# Patient Record
Sex: FEMALE | Race: BLACK OR AFRICAN AMERICAN | URBAN - METROPOLITAN AREA
[De-identification: names, ages, dates, MRNs, and addresses within clinical notes are randomized per-mention and may not be internally consistent; named-entity substitution may affect disease eponyms.]

---

## 2017-09-19 ENCOUNTER — HISTORICAL (OUTPATIENT)
Dept: LAB | Facility: HOSPITAL | Age: 32
End: 2017-09-19

## 2017-09-19 LAB
ABS NEUT (OLG): 2.98 X10(3)/MCL (ref 2.1–9.2)
ALBUMIN SERPL-MCNC: 3.6 GM/DL (ref 3.4–5)
ALBUMIN/GLOB SERPL: 0.9 RATIO (ref 1.1–2)
ALP SERPL-CCNC: 76 UNIT/L (ref 38–126)
ALT SERPL-CCNC: 22 UNIT/L (ref 12–78)
APPEARANCE, UA: ABNORMAL
AST SERPL-CCNC: 13 UNIT/L (ref 15–37)
BACTERIA SPEC CULT: ABNORMAL /HPF
BASOPHILS # BLD AUTO: 0 X10(3)/MCL (ref 0–0.2)
BASOPHILS NFR BLD AUTO: 0 %
BILIRUB SERPL-MCNC: 0.7 MG/DL (ref 0.2–1)
BILIRUB UR QL STRIP: NEGATIVE
BILIRUBIN DIRECT+TOT PNL SERPL-MCNC: 0
BILIRUBIN DIRECT+TOT PNL SERPL-MCNC: 0.7 MG/DL (ref 0–0.8)
BUN SERPL-MCNC: 10 MG/DL (ref 7–18)
CALCIUM SERPL-MCNC: 8.8 MG/DL (ref 8.5–10.1)
CHLORIDE SERPL-SCNC: 105 MMOL/L (ref 98–107)
CHOLEST SERPL-MCNC: 177 MG/DL (ref 0–200)
CHOLEST/HDLC SERPL: 3.5 {RATIO} (ref 0–4)
CO2 SERPL-SCNC: 27 MMOL/L (ref 21–32)
COLOR UR: YELLOW
CREAT SERPL-MCNC: 0.74 MG/DL (ref 0.55–1.02)
DEPRECATED CALCIDIOL+CALCIFEROL SERPL-MC: 6.94 NG/ML (ref 30–80)
EOSINOPHIL # BLD AUTO: 0.3 X10(3)/MCL (ref 0–0.9)
EOSINOPHIL NFR BLD AUTO: 4 %
ERYTHROCYTE [DISTWIDTH] IN BLOOD BY AUTOMATED COUNT: 12.4 % (ref 11.5–17)
EST. AVERAGE GLUCOSE BLD GHB EST-MCNC: 94 MG/DL
GLOBULIN SER-MCNC: 3.9 GM/DL (ref 2.4–3.5)
GLUCOSE (UA): NEGATIVE
GLUCOSE SERPL-MCNC: 87 MG/DL (ref 74–106)
HAV IGM SERPL QL IA: NEGATIVE
HBA1C MFR BLD: 4.9 % (ref 4.2–6.3)
HBV CORE IGM SERPL QL IA: NEGATIVE
HBV SURFACE AG SERPL QL IA: NEGATIVE
HCT VFR BLD AUTO: 41.9 % (ref 37–47)
HCV AB SERPL QL IA: NEGATIVE
HDLC SERPL-MCNC: 50 MG/DL (ref 35–60)
HEPATITIS PANEL INTERP: NORMAL
HGB BLD-MCNC: 13 GM/DL (ref 12–16)
HGB UR QL STRIP: NEGATIVE
HIV 1+2 AB+HIV1 P24 AG SERPL QL IA: NEGATIVE
KETONES UR QL STRIP: NEGATIVE
LDLC SERPL CALC-MCNC: 104 MG/DL (ref 0–129)
LEUKOCYTE ESTERASE UR QL STRIP: ABNORMAL
LYMPHOCYTES # BLD AUTO: 3.5 X10(3)/MCL (ref 0.6–4.6)
LYMPHOCYTES NFR BLD AUTO: 48 %
MCH RBC QN AUTO: 28.1 PG (ref 27–31)
MCHC RBC AUTO-ENTMCNC: 31 GM/DL (ref 33–36)
MCV RBC AUTO: 90.5 FL (ref 80–94)
MONOCYTES # BLD AUTO: 0.5 X10(3)/MCL (ref 0.1–1.3)
MONOCYTES NFR BLD AUTO: 7 %
NEUTROPHILS # BLD AUTO: 2.98 X10(3)/MCL (ref 2.1–9.2)
NEUTROPHILS NFR BLD AUTO: 41 %
NITRITE UR QL STRIP: NEGATIVE
PH UR STRIP: 6.5 [PH] (ref 5–9)
PLATELET # BLD AUTO: 376 X10(3)/MCL (ref 130–400)
PMV BLD AUTO: 9.4 FL (ref 9.4–12.4)
POTASSIUM SERPL-SCNC: 4.8 MMOL/L (ref 3.5–5.1)
PROT SERPL-MCNC: 7.5 GM/DL (ref 6.4–8.2)
PROT UR QL STRIP: NEGATIVE
RBC # BLD AUTO: 4.63 X10(6)/MCL (ref 4.2–5.4)
RBC #/AREA URNS HPF: ABNORMAL /[HPF]
RPR SER QL: NORMAL
SODIUM SERPL-SCNC: 142 MMOL/L (ref 136–145)
SP GR UR STRIP: 1.02 (ref 1–1.03)
SQUAMOUS EPITHELIAL, UA: ABNORMAL
TRIGL SERPL-MCNC: 116 MG/DL (ref 30–150)
TSH SERPL-ACNC: 0.85 MIU/ML (ref 0.36–3.74)
UROBILINOGEN UR STRIP-ACNC: 1
VLDLC SERPL CALC-MCNC: 23 MG/DL
WBC # SPEC AUTO: 7.3 X10(3)/MCL (ref 4.5–11.5)
WBC #/AREA URNS HPF: ABNORMAL /[HPF]

## 2017-11-06 ENCOUNTER — HISTORICAL (OUTPATIENT)
Dept: HEMATOLOGY/ONCOLOGY | Facility: CLINIC | Age: 32
End: 2017-11-06

## 2017-12-19 LAB
INFLUENZA A ANTIGEN, POC: POSITIVE
INFLUENZA B ANTIGEN, POC: NEGATIVE
RAPID GROUP A STREP (OHS): NEGATIVE

## 2018-07-17 ENCOUNTER — HISTORICAL (OUTPATIENT)
Dept: LAB | Facility: HOSPITAL | Age: 33
End: 2018-07-17

## 2018-07-17 LAB
APPEARANCE, UA: CLEAR
BACTERIA SPEC CULT: NORMAL /HPF
BILIRUB UR QL STRIP: NEGATIVE
COLOR UR: YELLOW
GLUCOSE (UA): NEGATIVE
HGB UR QL STRIP: NEGATIVE
KETONES UR QL STRIP: NEGATIVE
LEUKOCYTE ESTERASE UR QL STRIP: NEGATIVE
NITRITE UR QL STRIP: NEGATIVE
PH UR STRIP: 6 [PH] (ref 5–9)
PROT UR QL STRIP: NEGATIVE
RBC #/AREA URNS HPF: NORMAL /[HPF]
SP GR UR STRIP: 1.01 (ref 1–1.03)
SQUAMOUS EPITHELIAL, UA: NORMAL
UROBILINOGEN UR STRIP-ACNC: 1
WBC #/AREA URNS HPF: NORMAL /[HPF]

## 2018-07-19 LAB — FINAL CULTURE: NO GROWTH

## 2019-02-26 ENCOUNTER — HISTORICAL (OUTPATIENT)
Dept: ADMINISTRATIVE | Facility: HOSPITAL | Age: 34
End: 2019-02-26

## 2019-02-26 LAB
ALBUMIN SERPL-MCNC: 3.5 GM/DL (ref 3.4–5)
ALBUMIN/GLOB SERPL: 1 {RATIO}
ALP SERPL-CCNC: 71 UNIT/L (ref 38–126)
ALT SERPL-CCNC: 16 UNIT/L (ref 12–78)
AMPHET UR QL SCN: NORMAL
AST SERPL-CCNC: 10 UNIT/L (ref 15–37)
B-HCG SERPL QL: NEGATIVE
BARBITURATE SCN PRESENT UR: NORMAL
BENZODIAZ UR QL SCN: NORMAL
BILIRUB SERPL-MCNC: 0.4 MG/DL (ref 0.2–1)
BILIRUBIN DIRECT+TOT PNL SERPL-MCNC: 0.1 MG/DL (ref 0–0.2)
BILIRUBIN DIRECT+TOT PNL SERPL-MCNC: 0.3 MG/DL (ref 0–0.8)
BUN SERPL-MCNC: 7 MG/DL (ref 7–18)
CALCIUM SERPL-MCNC: 8.3 MG/DL (ref 8.5–10.1)
CANNABINOIDS UR QL SCN: NORMAL
CHLORIDE SERPL-SCNC: 105 MMOL/L (ref 98–107)
CO2 SERPL-SCNC: 27 MMOL/L (ref 21–32)
COCAINE UR QL SCN: NORMAL
CREAT SERPL-MCNC: 0.78 MG/DL (ref 0.55–1.02)
DEPRECATED CALCIDIOL+CALCIFEROL SERPL-MC: 27.8 NG/ML (ref 30–80)
ERYTHROCYTE [DISTWIDTH] IN BLOOD BY AUTOMATED COUNT: 12.2 % (ref 11.5–17)
GLOBULIN SER-MCNC: 3.5 GM/DL (ref 2.4–3.5)
GLUCOSE SERPL-MCNC: 81 MG/DL (ref 74–106)
HCT VFR BLD AUTO: 39.1 % (ref 37–47)
HGB BLD-MCNC: 12.2 GM/DL (ref 12–16)
MCH RBC QN AUTO: 28.1 PG (ref 27–31)
MCHC RBC AUTO-ENTMCNC: 31.2 GM/DL (ref 33–36)
MCV RBC AUTO: 90.1 FL (ref 80–94)
OPIATES UR QL SCN: NORMAL
PCP UR QL: NORMAL
PH UR STRIP.AUTO: 6 [PH] (ref 5–7.5)
PLATELET # BLD AUTO: 409 X10(3)/MCL (ref 130–400)
PMV BLD AUTO: 9.8 FL (ref 9.4–12.4)
POTASSIUM SERPL-SCNC: 3.9 MMOL/L (ref 3.5–5.1)
PROT SERPL-MCNC: 7 GM/DL (ref 6.4–8.2)
RBC # BLD AUTO: 4.34 X10(6)/MCL (ref 4.2–5.4)
SODIUM SERPL-SCNC: 138 MMOL/L (ref 136–145)
SP GR FLD REFRACTOMETRY: 1.01 (ref 1–1.03)
T3RU NFR SERPL: 36 % (ref 31–39)
T4 SERPL-MCNC: 8.2 MCG/DL (ref 4.7–13.3)
TSH SERPL-ACNC: 0.7 MIU/L (ref 0.36–3.74)
WBC # SPEC AUTO: 6.7 X10(3)/MCL (ref 4.5–11.5)

## 2022-04-10 ENCOUNTER — HISTORICAL (OUTPATIENT)
Dept: ADMINISTRATIVE | Facility: HOSPITAL | Age: 37
End: 2022-04-10

## 2022-04-27 VITALS
SYSTOLIC BLOOD PRESSURE: 136 MMHG | OXYGEN SATURATION: 100 % | BODY MASS INDEX: 39.22 KG/M2 | WEIGHT: 235.44 LBS | DIASTOLIC BLOOD PRESSURE: 100 MMHG | HEIGHT: 65 IN

## 2022-04-30 NOTE — H&P
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        Health Status   Allergies:    Allergic Reactions (All)  Mild  Lortab- Itch.  Canceled/Inactive Reactions (All)  No Known Allergies,    Allergies (1) Active Reaction  Lortab itch     Current medications:  (Selected)   Prescriptions  Prescribed  Contrave 8 mg-90 mg oral tablet, extended release: 1 tab(s), Oral, BID, do not crush or chew, only start this Rx after completing starter pack, # 60 tab(s), 2 Refill(s), Pharmacy: [facility]/pharmacy #[id number]  Contrave 8 mg-90 mg oral tablet, extended release: See Instructions, Start as follows:  Wk 1: 1 tab po x qAM  Wk 2: 1 tab po x bid  Wk 3: 2 tabs po x qAM, 1 tab po x qPM  Wk 4: 2 tabs po x bid, # 70 tab(s), 0 Refill(s), Pharmacy: [facility]/pharmacy #[id number]  Vitamin D2 50,000 intl units oral capsule: 50,000 IntUnit = 1 cap(s), Oral, qWeek, # 4 cap(s), 2 Refill(s), Pharmacy: [facility]acy #[id number]   Problem list:    All Problems  Bacterial UTI / SNOMED CT 8967361299 / Confirmed  Pap smear for cervical cancer screening / SNOMED CT 5921000634 / Confirmed  History of herpes genitalis / SNOMED CT 3556336174 / Confirmed  ASCUS with positive high risk human papillomavirus of vagina / SNOMED CT 402640531 / Confirmed  Idiopathic hypocalcemia / SNOMED CT  8452962 / Confirmed  Obesity / SNOMED CT 9038976738 / Confirmed  Preventative health care / SNOMED CT 717725999 / Confirmed  Screen for STD (sexually transmitted disease) / SNOMED CT 384644384 / Confirmed  Vitamin D deficiency / SNOMED CT 38568983 / Confirmed,    Active Problems (9)  ASCUS with positive high risk human papillomavirus of vagina   Bacterial UTI   History of herpes genitalis   Idiopathic hypocalcemia   Obesity   Pap smear for cervical cancer screening   Preventative health care   Screen for STD (sexually transmitted disease)   Vitamin D deficiency         Histories   Past Medical History:    Resolved  Hyperlipidemia (38114933):  Resolved.  Mild tobacco abuse (258662718):  Resolved on 10/7/2015 at 29 years.   Procedure history:    Tubal ligation (990713427).      Gynecologic history   Menstrual cycle: date of last menstrual period 9/15/2015.   Pregnancy status:  4, para 4.   Pap test: abnormal, 2 weeks .   Mammogram: not yet.   Family History:    Hypertension.  Mother     Social History        Social & Psychosocial Habits    Alcohol  2015  Type: Wine    Frequency: 1-2 times per week    Employment/School  2015  Status: Employed    Description: full time rn    Exercise  10/07/2015  Times per week: 1-2 times/week    Exercise type: Walking    Home/Environment  2015  Lives with: Spouse    Nutrition/Health  10/07/2015  Type of diet: Regular    Sexual  10/07/2015  Sexually active: Yes    Substance Abuse  2015  Use: Never    Tobacco  2015  Use: Never smoker  .        Review of Systems   Integumentary:  Negative.    Neurologic:  Negative.    Psychiatric:  Negative.    Endocrine:  Negative.    Genitourinary:  Negative.         Sexual function: Normal.    Hematology/Lymphatics:  Negative.    Immunologic:  Negative.    Constitutional:  Negative.    Eye:  Negative.    Ear/Nose/Mouth/Throat:  Negative.    Cardiovascular:  Negative.    Respiratory:  Negative.    Gastrointestinal:   Negative.    Musculoskeletal:  Negative.    Breast:  Negative.    All other systems are negative      Physical Examination   Chaperone present:  During the entire physical exam, Noymaryann Landry MA.    Vital Signs   11/6/2017 9:41 CST       Peripheral Pulse Rate     90 bpm                             Systolic Blood Pressure   128 mmHg                             Diastolic Blood Pressure  84 mmHg     Measurements from flowsheet : Measurements   11/6/2017 9:41 CST       Weight Dosing             108 kg                             Weight Measured           108 kg                             Weight Measured and Calculated in Lbs     238.10 lb                             Weight Estimated          108 kg                             Height/Length Dosing      166 cm                             Height/Length Measured    166 cm                             Height/Length Estimated   166 cm                             BSA Measured              2.23 m2                             BSA Estimated             2.23 m2                             Body Mass Index Estimated 39.19 kg/m2                             Body Mass Index Measured  39.19 kg/m2     General:  Alert and oriented, No acute distress.    Eye:  Pupils are equal, round and reactive to light, Extraocular movements are intact, Normal conjunctiva, Vision unchanged.    HENT:  Normocephalic, Normal hearing, Oral mucosa is moist, No pharyngeal erythema, No sinus tenderness.    Neck:  Supple, Non-tender, No jugular venous distention, No lymphadenopathy, No thyromegaly.    Respiratory:  Lungs are clear to auscultation, Respirations are non-labored, Breath sounds are equal, Symmetrical chest wall expansion, No chest wall tenderness.    Cardiovascular:  Normal rate, Regular rhythm, No murmur, No gallop, Good pulses equal in all extremities, Normal peripheral perfusion, No edema.    Breast:  Deferred.    Gastrointestinal:  Soft, Non-tender, Non-distended, Normal bowel sounds, No  "organomegaly.    Genitourinary:  No costovertebral angle tenderness, No inguinal tenderness, No urethral discharge, No lesions.         Vulva: Within normal limits.         Urethra: Within normal limits.         Vagina: Within normal limits.         Cervix: After informed consent was signed, a cervical/vaginal colposcopy was performed today with 5% Acetic Acid & Lugol's solution revealing no obvious AWE or non-staining lesions; an office ECC was performed today  .         Adnexa: Within normal limits.         Bartholin's/skene's glands: WNL.    Lymphatics:  No lymphadenopathy neck, axilla, groin.    Musculoskeletal:  Normal range of motion, Normal strength, No tenderness, No swelling.    Integumentary:  Warm, Dry, Intact, No pallor, No rash.    Neurologic:  Alert, Oriented, Normal sensory, Normal motor function, No focal deficits, Cranial Nerves II-XII are grossly intact.    Cognition and Speech:  Oriented.    Psychiatric:  Cooperative, Appropriate mood & affect, Normal judgment, Non-suicidal.       Review / Management   Final Diagnosis  CERVIX BIOPSY 5-7 O'CLOCK:    ACUTE AND CHRONIC CERVICITIS WITH REACTIVE SQUAMOUS ATYPIA.    NO CONVINCING EVIDENCE OF DYSPLASIA OR HPV EFFECT.     COMMENT:  The specimen was entirely submitted and cut at multiple levels until the tissue was exhausted.       *Electronically Signed*     Guy Bedoya MD  Verified: 09/30/15 16:42      Specimen Description  1 Cervical Biopsy 5-7 o'clock    Clinical Information       Pre-Operative Diagnosis:     HGSIL, abnormal pap    Gross Description  Received in formalin labeled "5-7 o'clock cervical biopsy" is an unoriented piece of gray white soft tissue measuring 0.5x0.2x0.2 cm.  The specimen is submitted in its entirety between sponges in a single cassette labeled [id number],  0-1-1 2x.      RLM/CM    Microscopic Description  Microscopic examination performed.  Please see diagnosis.         Impression and Plan   Assessment  Barbie Benitez is a 31yo BF "  with PSH BTL with past Gynecological History significant for Hepatitis B and Herpes Simplex Type 2 presented for was previously seen 9/28/15 after Dr Julio Malloy performed a cervical pap smear 8/27/15 cervical pap smear c/w Atypical Squamous Cells, cannot exclude a HGSIL and +HR HPV non-16/18, and a colposcopy of the cervix and vagina with 5% Acetic Acid revealing AWE between 5-7o'clock with mild/moderate vascular changes consistent with CIN2-3 with biopsy c/w this and evidence of dysplasia/atypia or HPV effects; patient failed to follow up for results and follow-up surveillance presented to her PCP, Dr Mahi Bundy, 17 and collected a cervical pap smear returning ASCUS, cannot exclude HGSIL with (+)HR HPV; Ms. Benitez presented 17 for new patient consultation at the request of Dr. Champagne.    Cervical Colposcopy with ECC:  17: a cervical/vaginal colposcopy was performed today with 5% Acetic Acid & Lugol's solution revealing no obvious AWE or non-staining lesions.  17: office ECC was performed today.    (+) HR HPV: I had a detailed discussion with patient specifical reviewing the importance of HPV infections and her Immune System and controlling chronic medical conditions and noting that certain medications (Steroids or Transplant Medications) can decrease your immune, also  including excessive Alcohol and Tobacco use or second hand smoke; and despite optimal health, there is still a high risk for persistent HPV infections, but usually over time (18-24 months) your body's Immune System should clear the HPV virus, warning that future infections could be possible with reinfections usually by sexual  transmition; therefore, we highly encourage safe sex practices and condom use and optimal Immune Health by avoiding smoking, proper diet/exercise, stress reduction.     (+) HSV2/Hepatitis B: under the care of her local PCP, Dr Mahi Bundy, reporting no recent  outbreaks.    Obesity (BMI: 39.19): Encourage weight loss under the care of local PCP, Dr Mahi Bundy.    Patient Portal: Patient was encouraged to join [facility] patient portal for her future medical records regarding her labs, biopsy & scan reports; any further questions regarding her care she was encouraged to call the office for additional help.    Plan  Mrs. Benitez presented 11/6/17 for new patient consultation at the request of Dr. Champagne with persistent ASCUS, cannot exclude HGSIL with (+)HR HPV; I reviewed her PMH/PSH and current history of present illness significant for persistent abnormal cervical pap smear; a cervical/vaginal colposcopy was performed today with 5% Acetic Acid & Lugol's solution revealing no obvious AWE or non-staining lesions; an office ECC was performed today; I will schedule her for a 6 month surveillance cervical pap smear exam if ECC today is normal; if ECC today is abnormal we will consider cervical cone biopsy; patient was encouraged to call the office if she did not hear back from her ECC results today within the next 2 weeks; I was available for questions answered them to patient's satisfaction in lay terms to best my ability, she verbalizes understanding of her condition and agrees with this plan, she was very pleased with her initial consultation and clinical care today and was discharged from the clinic with no unanswered questions.      Thank you for allowing me to participate in Ms. Benitez' gynecological care, I'll keep you posted regarding further management and treatment plan.      Professional Services   Consulting Physician/PCP:  Silvia Bundy- [phone]   (fax) [phone]

## 2022-09-21 ENCOUNTER — HISTORICAL (OUTPATIENT)
Dept: ADMINISTRATIVE | Facility: HOSPITAL | Age: 37
End: 2022-09-21